# Patient Record
Sex: FEMALE | Race: WHITE | URBAN - METROPOLITAN AREA
[De-identification: names, ages, dates, MRNs, and addresses within clinical notes are randomized per-mention and may not be internally consistent; named-entity substitution may affect disease eponyms.]

---

## 2021-06-03 ENCOUNTER — HOSPITAL ENCOUNTER (OUTPATIENT)
Facility: HOSPITAL | Age: 21
Setting detail: OBSERVATION
Discharge: HOME | End: 2021-06-03
Attending: OBSTETRICS & GYNECOLOGY | Admitting: OBSTETRICS & GYNECOLOGY
Payer: MEDICAID

## 2021-06-03 VITALS — DIASTOLIC BLOOD PRESSURE: 67 MMHG | HEART RATE: 88 BPM | SYSTOLIC BLOOD PRESSURE: 121 MMHG

## 2021-06-03 PROBLEM — O36.8190 DECREASED FETAL MOVEMENT: Status: ACTIVE | Noted: 2021-06-03

## 2021-06-03 PROCEDURE — 59025 FETAL NON-STRESS TEST: CPT

## 2021-06-03 PROCEDURE — 99203 OFFICE O/P NEW LOW 30 MIN: CPT

## 2021-06-03 RX ORDER — PRENATAL VIT/IRON FUM/FOLIC AC 27MG-0.8MG
1 TABLET ORAL DAILY
COMMUNITY

## 2021-06-04 NOTE — PAYOR COMM NOTE
--------------  ADMISSION REVIEW     Payor: Shanti Horowitz #:  [id number]    OBSERVATION PATIENT      ADMITTED OB  PATIENT HAD NOT FELT BABY MOVE  PT IS G1PO    GESTATION 31W 6 DAYS  DECREASED FETAL MOVEMENT    NST  REACTIVE BASELINE 130 MOD VARIABI

## 2021-06-04 NOTE — TRIAGE
[facility]      Triage Note    Verónica Marcelo Patient Status:  Observation    2000 MRN [id number]   Location [address] Attending Jason Umana MD   Hosp Day # 0 PCP No primary care provider on file. PM

## 2021-06-04 NOTE — NST
Nonstress Test   Patient: Tomasa Dillon    Gestation: 31w6d      Pt has care at [address] works in area. Was concerned that she was not feeling movement. Presents for NST. No ctx, bleeding or LOF.    NST: reactive.  Baseline 130, mod variability, no d

## 2021-06-04 NOTE — PROGRESS NOTES
Pt is a 23year old female admitted to TR1/TR1-A. Patient presents with:  Decreased Fetal Movement: Patient has not felt baby move today. Has prenatal care with [facility]. Pt is  Unknown intra-uterine pregnancy.   History obtained, consents sig